# Patient Record
Sex: MALE | Race: BLACK OR AFRICAN AMERICAN | Employment: FULL TIME | URBAN - METROPOLITAN AREA
[De-identification: names, ages, dates, MRNs, and addresses within clinical notes are randomized per-mention and may not be internally consistent; named-entity substitution may affect disease eponyms.]

---

## 2017-02-14 ENCOUNTER — PATIENT MESSAGE (OUTPATIENT)
Dept: FAMILY MEDICINE CLINIC | Facility: CLINIC | Age: 27
End: 2017-02-14

## 2017-02-15 NOTE — TELEPHONE ENCOUNTER
From: Rebeca Delarosa  To: Rinku Corado MD  Sent: 2/14/2017 6:00 PM CST  Subject: Prescription Question    Fawn العراقي,     Can you write me a subscription for cephalexin 500.  I've taken it before for bumps that i would get in the back of my head through my previ

## 2017-11-06 ENCOUNTER — OFFICE VISIT (OUTPATIENT)
Dept: FAMILY MEDICINE CLINIC | Facility: CLINIC | Age: 27
End: 2017-11-06

## 2017-11-06 VITALS
DIASTOLIC BLOOD PRESSURE: 86 MMHG | HEIGHT: 75 IN | TEMPERATURE: 98 F | BODY MASS INDEX: 30.21 KG/M2 | SYSTOLIC BLOOD PRESSURE: 118 MMHG | RESPIRATION RATE: 16 BRPM | WEIGHT: 243 LBS | HEART RATE: 62 BPM

## 2017-11-06 DIAGNOSIS — Z13.21 SCREENING FOR ENDOCRINE, NUTRITIONAL, METABOLIC AND IMMUNITY DISORDER: ICD-10-CM

## 2017-11-06 DIAGNOSIS — Z23 NEED FOR PROPHYLACTIC VACCINATION AND INOCULATION AGAINST INFLUENZA: ICD-10-CM

## 2017-11-06 DIAGNOSIS — Z13.0 SCREENING FOR ENDOCRINE, NUTRITIONAL, METABOLIC AND IMMUNITY DISORDER: ICD-10-CM

## 2017-11-06 DIAGNOSIS — Z13.29 SCREENING FOR ENDOCRINE, NUTRITIONAL, METABOLIC AND IMMUNITY DISORDER: ICD-10-CM

## 2017-11-06 DIAGNOSIS — Z00.00 ANNUAL PHYSICAL EXAM: Primary | ICD-10-CM

## 2017-11-06 DIAGNOSIS — Z13.228 SCREENING FOR ENDOCRINE, NUTRITIONAL, METABOLIC AND IMMUNITY DISORDER: ICD-10-CM

## 2017-11-06 PROCEDURE — 90471 IMMUNIZATION ADMIN: CPT | Performed by: FAMILY MEDICINE

## 2017-11-06 PROCEDURE — 99395 PREV VISIT EST AGE 18-39: CPT | Performed by: FAMILY MEDICINE

## 2017-11-06 PROCEDURE — 90686 IIV4 VACC NO PRSV 0.5 ML IM: CPT | Performed by: FAMILY MEDICINE

## 2017-11-07 NOTE — PROGRESS NOTES
Chief Complaint:   Patient presents with:  Routine Physical    HPI:   This is a 32year old male due for physical.     Patient's due for labs, no new complaints. HPI: see above. History reviewed. No pertinent past medical history.   History review suicidal ideas, behavioral problems, sleep disturbance and agitation. The patient is not nervous/anxious.         EXAM:   /86   Pulse 62   Temp 97.7 °F (36.5 °C) (Oral)   Resp 16   Ht 75\"   Wt 243 lb   BMI 30.37 kg/m²  Estimated body mass index is 30 Annual physical exam  -due for physical, no new complaints. 2. Need for prophylactic vaccination and inoculation against influenza  -updated.    - FLULAVAL INFLUENZA VACCINE QUAD PRESERVATIVE FREE 0.5 ML    3. Screening for endocrine, nutritional, metab

## 2017-11-16 ENCOUNTER — LAB ENCOUNTER (OUTPATIENT)
Dept: LAB | Age: 27
End: 2017-11-16
Attending: FAMILY MEDICINE
Payer: COMMERCIAL

## 2017-11-16 DIAGNOSIS — Z13.29 SCREENING FOR ENDOCRINE, NUTRITIONAL, METABOLIC AND IMMUNITY DISORDER: ICD-10-CM

## 2017-11-16 DIAGNOSIS — Z13.21 SCREENING FOR ENDOCRINE, NUTRITIONAL, METABOLIC AND IMMUNITY DISORDER: ICD-10-CM

## 2017-11-16 DIAGNOSIS — Z13.228 SCREENING FOR ENDOCRINE, NUTRITIONAL, METABOLIC AND IMMUNITY DISORDER: ICD-10-CM

## 2017-11-16 DIAGNOSIS — Z13.0 SCREENING FOR ENDOCRINE, NUTRITIONAL, METABOLIC AND IMMUNITY DISORDER: ICD-10-CM

## 2017-11-16 PROCEDURE — 80061 LIPID PANEL: CPT | Performed by: FAMILY MEDICINE

## 2017-11-16 PROCEDURE — 80050 GENERAL HEALTH PANEL: CPT | Performed by: FAMILY MEDICINE

## 2017-11-16 PROCEDURE — 36415 COLL VENOUS BLD VENIPUNCTURE: CPT | Performed by: FAMILY MEDICINE

## 2017-11-22 ENCOUNTER — TELEPHONE (OUTPATIENT)
Dept: FAMILY MEDICINE CLINIC | Facility: CLINIC | Age: 27
End: 2017-11-22

## 2017-11-28 ENCOUNTER — TELEPHONE (OUTPATIENT)
Dept: FAMILY MEDICINE CLINIC | Facility: CLINIC | Age: 27
End: 2017-11-28

## 2017-11-28 NOTE — TELEPHONE ENCOUNTER
Patient left a form for his insurance carrier to be filled contingent the lab results the labs came back in on or around the 17th the patient needs the form asap.  Please contact him once ready

## 2017-11-29 NOTE — TELEPHONE ENCOUNTER
Called patient and let him know the form is completed and ready for him to  at the . Patient states understanding and will be coming in today to  the form.

## 2019-01-21 ENCOUNTER — PATIENT OUTREACH (OUTPATIENT)
Dept: FAMILY MEDICINE CLINIC | Facility: CLINIC | Age: 29
End: 2019-01-21

## 2019-03-18 ENCOUNTER — OFFICE VISIT (OUTPATIENT)
Dept: FAMILY MEDICINE CLINIC | Facility: CLINIC | Age: 29
End: 2019-03-18
Payer: COMMERCIAL

## 2019-03-18 VITALS
WEIGHT: 273 LBS | TEMPERATURE: 98 F | HEIGHT: 75 IN | BODY MASS INDEX: 33.94 KG/M2 | RESPIRATION RATE: 16 BRPM | DIASTOLIC BLOOD PRESSURE: 86 MMHG | HEART RATE: 74 BPM | SYSTOLIC BLOOD PRESSURE: 138 MMHG

## 2019-03-18 DIAGNOSIS — Z00.00 ANNUAL PHYSICAL EXAM: Primary | ICD-10-CM

## 2019-03-18 DIAGNOSIS — Z13.0 SCREENING FOR ENDOCRINE, NUTRITIONAL, METABOLIC AND IMMUNITY DISORDER: ICD-10-CM

## 2019-03-18 DIAGNOSIS — Z13.29 SCREENING FOR ENDOCRINE, NUTRITIONAL, METABOLIC AND IMMUNITY DISORDER: ICD-10-CM

## 2019-03-18 DIAGNOSIS — Z13.21 SCREENING FOR ENDOCRINE, NUTRITIONAL, METABOLIC AND IMMUNITY DISORDER: ICD-10-CM

## 2019-03-18 DIAGNOSIS — Z13.228 SCREENING FOR ENDOCRINE, NUTRITIONAL, METABOLIC AND IMMUNITY DISORDER: ICD-10-CM

## 2019-03-18 PROCEDURE — 99395 PREV VISIT EST AGE 18-39: CPT | Performed by: FAMILY MEDICINE

## 2019-03-19 NOTE — PROGRESS NOTES
Chief Complaint:   Patient presents with: Annual    HPI:   This is a 25year old male resenting for annual physical.  Patient has a history of elevated blood pressure currently under control no new complaints at this time. HPI: see above.      History easily. Psychiatric/Behavioral: Negative for suicidal ideas, behavioral problems, sleep disturbance and agitation. The patient is not nervous/anxious.         EXAM:   /86   Pulse 74   Temp 98.2 °F (36.8 °C) (Oral)   Resp 16   Ht 75\"   Wt 273 lb   B Diagnoses and all orders for this visit:    1. Annual physical exam  -wellness visit was done. 2. Screening for endocrine, nutritional, metabolic and immunity disorder  -due for labs. - CBC WITH DIFFERENTIAL WITH PLATELET;  Future  - COMP METABOLIC

## 2019-03-22 ENCOUNTER — LAB ENCOUNTER (OUTPATIENT)
Dept: LAB | Age: 29
End: 2019-03-22
Attending: FAMILY MEDICINE
Payer: COMMERCIAL

## 2019-03-22 DIAGNOSIS — Z13.29 SCREENING FOR ENDOCRINE, NUTRITIONAL, METABOLIC AND IMMUNITY DISORDER: ICD-10-CM

## 2019-03-22 DIAGNOSIS — Z13.228 SCREENING FOR ENDOCRINE, NUTRITIONAL, METABOLIC AND IMMUNITY DISORDER: ICD-10-CM

## 2019-03-22 DIAGNOSIS — Z13.21 SCREENING FOR ENDOCRINE, NUTRITIONAL, METABOLIC AND IMMUNITY DISORDER: ICD-10-CM

## 2019-03-22 DIAGNOSIS — Z13.0 SCREENING FOR ENDOCRINE, NUTRITIONAL, METABOLIC AND IMMUNITY DISORDER: ICD-10-CM

## 2019-03-22 LAB
ALBUMIN SERPL-MCNC: 4.3 G/DL (ref 3.4–5)
ALBUMIN/GLOB SERPL: 1.2 {RATIO} (ref 1–2)
ALP LIVER SERPL-CCNC: 59 U/L (ref 45–117)
ALT SERPL-CCNC: 23 U/L (ref 16–61)
ANION GAP SERPL CALC-SCNC: 6 MMOL/L (ref 0–18)
AST SERPL-CCNC: 26 U/L (ref 15–37)
BASOPHILS # BLD AUTO: 0.03 X10(3) UL (ref 0–0.2)
BASOPHILS NFR BLD AUTO: 0.7 %
BILIRUB SERPL-MCNC: 0.7 MG/DL (ref 0.1–2)
BUN BLD-MCNC: 19 MG/DL (ref 7–18)
BUN/CREAT SERPL: 13.1 (ref 10–20)
CALCIUM BLD-MCNC: 9.4 MG/DL (ref 8.5–10.1)
CHLORIDE SERPL-SCNC: 106 MMOL/L (ref 98–107)
CHOLEST SMN-MCNC: 154 MG/DL (ref ?–200)
CO2 SERPL-SCNC: 29 MMOL/L (ref 21–32)
CREAT BLD-MCNC: 1.45 MG/DL (ref 0.7–1.3)
DEPRECATED RDW RBC AUTO: 39.5 FL (ref 35.1–46.3)
EOSINOPHIL # BLD AUTO: 0.04 X10(3) UL (ref 0–0.7)
EOSINOPHIL NFR BLD AUTO: 0.9 %
ERYTHROCYTE [DISTWIDTH] IN BLOOD BY AUTOMATED COUNT: 12 % (ref 11–15)
GLOBULIN PLAS-MCNC: 3.5 G/DL (ref 2.8–4.4)
GLUCOSE BLD-MCNC: 96 MG/DL (ref 70–99)
HCT VFR BLD AUTO: 46.1 % (ref 39–53)
HDLC SERPL-MCNC: 37 MG/DL (ref 40–59)
HGB BLD-MCNC: 14.8 G/DL (ref 13–17.5)
IMM GRANULOCYTES # BLD AUTO: 0.01 X10(3) UL (ref 0–1)
IMM GRANULOCYTES NFR BLD: 0.2 %
LDLC SERPL CALC-MCNC: 101 MG/DL (ref ?–100)
LYMPHOCYTES # BLD AUTO: 1.74 X10(3) UL (ref 1–4)
LYMPHOCYTES NFR BLD AUTO: 40.9 %
M PROTEIN MFR SERPL ELPH: 7.8 G/DL (ref 6.4–8.2)
MCH RBC QN AUTO: 28.7 PG (ref 26–34)
MCHC RBC AUTO-ENTMCNC: 32.1 G/DL (ref 31–37)
MCV RBC AUTO: 89.3 FL (ref 80–100)
MONOCYTES # BLD AUTO: 0.52 X10(3) UL (ref 0.1–1)
MONOCYTES NFR BLD AUTO: 12.2 %
NEUTROPHILS # BLD AUTO: 1.91 X10 (3) UL (ref 1.5–7.7)
NEUTROPHILS # BLD AUTO: 1.91 X10(3) UL (ref 1.5–7.7)
NEUTROPHILS NFR BLD AUTO: 45.1 %
NONHDLC SERPL-MCNC: 117 MG/DL (ref ?–130)
OSMOLALITY SERPL CALC.SUM OF ELEC: 294 MOSM/KG (ref 275–295)
PLATELET # BLD AUTO: 216 10(3)UL (ref 150–450)
POTASSIUM SERPL-SCNC: 4.2 MMOL/L (ref 3.5–5.1)
RBC # BLD AUTO: 5.16 X10(6)UL (ref 4.3–5.7)
SODIUM SERPL-SCNC: 141 MMOL/L (ref 136–145)
TRIGL SERPL-MCNC: 80 MG/DL (ref 30–149)
TSI SER-ACNC: 1.03 MIU/ML (ref 0.36–3.74)
VLDLC SERPL CALC-MCNC: 16 MG/DL (ref 0–30)
WBC # BLD AUTO: 4.3 X10(3) UL (ref 4–11)

## 2019-03-22 PROCEDURE — 80050 GENERAL HEALTH PANEL: CPT | Performed by: FAMILY MEDICINE

## 2019-03-22 PROCEDURE — 80061 LIPID PANEL: CPT | Performed by: FAMILY MEDICINE

## 2019-03-22 PROCEDURE — 36415 COLL VENOUS BLD VENIPUNCTURE: CPT | Performed by: FAMILY MEDICINE

## 2019-03-25 ENCOUNTER — TELEPHONE (OUTPATIENT)
Dept: FAMILY MEDICINE CLINIC | Facility: CLINIC | Age: 29
End: 2019-03-25

## 2019-03-25 DIAGNOSIS — E86.0 DEHYDRATION: Primary | ICD-10-CM

## 2019-03-25 NOTE — TELEPHONE ENCOUNTER
----- Message from Paige Saldivar MD sent at 3/25/2019 10:37 AM CDT -----  Stable, recheck CMP in 6-12 months.

## 2019-05-28 ENCOUNTER — TELEPHONE (OUTPATIENT)
Dept: FAMILY MEDICINE CLINIC | Facility: CLINIC | Age: 29
End: 2019-05-28

## 2019-05-28 NOTE — TELEPHONE ENCOUNTER
Pt is coming in to bring a form that needs to filled out for his insurance for Dr. Prince lopez. He also is picking up a copy of his labs.

## 2020-03-25 ENCOUNTER — TELEPHONE (OUTPATIENT)
Dept: FAMILY MEDICINE CLINIC | Facility: CLINIC | Age: 30
End: 2020-03-25

## 2020-03-25 DIAGNOSIS — M62.830 SPASM OF THORACIC BACK MUSCLE: Primary | ICD-10-CM

## 2020-03-25 PROCEDURE — 99442 PHONE E/M BY PHYS 11-20 MIN: CPT | Performed by: NURSE PRACTITIONER

## 2020-03-25 RX ORDER — ACETAMINOPHEN 500 MG
TABLET ORAL EVERY 8 HOURS PRN
Qty: 30 TABLET | Refills: 0 | Status: SHIPPED | OUTPATIENT
Start: 2020-03-25 | End: 2020-06-10

## 2020-03-25 RX ORDER — METHYLPREDNISOLONE 4 MG/1
TABLET ORAL
Qty: 1 KIT | Refills: 0 | Status: SHIPPED | OUTPATIENT
Start: 2020-03-25 | End: 2020-06-10

## 2020-03-25 RX ORDER — CYCLOBENZAPRINE HCL 10 MG
10 TABLET ORAL NIGHTLY PRN
Qty: 15 TABLET | Refills: 0 | Status: SHIPPED | OUTPATIENT
Start: 2020-03-25 | End: 2020-06-10 | Stop reason: ALTCHOICE

## 2020-03-25 NOTE — TELEPHONE ENCOUNTER
Chief Complaint:   Patient presents with:  Patient Question    HPI:   Virtual/Telephone Check-In    Carmela Oar verbally consents to a Virtual/Telephone Check-In service on 03/25/20.     Patient understands and accepts financial responsibility for any d Patient requesting prescription as his pharmacy was out.   -F/u in 1 week if no improvement, sooner if worsening symptoms. - acetaminophen 500 MG Oral Tab;  Take 1-2 tablets (500-1,000 mg total) by mouth every 8 (eight) hours as needed for Pain.  - methyl

## 2020-03-25 NOTE — TELEPHONE ENCOUNTER
Please see below message, LOV 3/8/19. Would you like this to be a scheduled phone visit or can I triage him for orders?

## 2020-03-25 NOTE — TELEPHONE ENCOUNTER
Pt called, he states he thinks he hurt his back a few months ago working out. It hurts \"off & on\" more frequently this past week. He states it is mid back pain radiates to left side. He denies any urinary issues.   He takes OTC Ibuprofen & it helps tem

## 2020-06-10 ENCOUNTER — OFFICE VISIT (OUTPATIENT)
Dept: FAMILY MEDICINE CLINIC | Facility: CLINIC | Age: 30
End: 2020-06-10
Payer: COMMERCIAL

## 2020-06-10 ENCOUNTER — LAB ENCOUNTER (OUTPATIENT)
Dept: LAB | Age: 30
End: 2020-06-10
Attending: FAMILY MEDICINE
Payer: COMMERCIAL

## 2020-06-10 ENCOUNTER — HOSPITAL ENCOUNTER (OUTPATIENT)
Dept: GENERAL RADIOLOGY | Age: 30
Discharge: HOME | End: 2020-06-10
Attending: FAMILY MEDICINE
Payer: COMMERCIAL

## 2020-06-10 VITALS
OXYGEN SATURATION: 96 % | HEIGHT: 75 IN | TEMPERATURE: 97 F | WEIGHT: 271 LBS | HEART RATE: 100 BPM | DIASTOLIC BLOOD PRESSURE: 78 MMHG | BODY MASS INDEX: 33.69 KG/M2 | SYSTOLIC BLOOD PRESSURE: 128 MMHG | RESPIRATION RATE: 16 BRPM

## 2020-06-10 DIAGNOSIS — M54.6 DISCOGENIC THORACIC PAIN: ICD-10-CM

## 2020-06-10 DIAGNOSIS — Z00.00 LABORATORY EXAMINATION ORDERED AS PART OF A ROUTINE GENERAL MEDICAL EXAMINATION: ICD-10-CM

## 2020-06-10 DIAGNOSIS — Z00.00 ANNUAL PHYSICAL EXAM: Primary | ICD-10-CM

## 2020-06-10 PROCEDURE — 36415 COLL VENOUS BLD VENIPUNCTURE: CPT | Performed by: FAMILY MEDICINE

## 2020-06-10 PROCEDURE — 72072 X-RAY EXAM THORAC SPINE 3VWS: CPT | Performed by: FAMILY MEDICINE

## 2020-06-10 PROCEDURE — 80061 LIPID PANEL: CPT | Performed by: FAMILY MEDICINE

## 2020-06-10 PROCEDURE — 99395 PREV VISIT EST AGE 18-39: CPT | Performed by: FAMILY MEDICINE

## 2020-06-10 PROCEDURE — 80050 GENERAL HEALTH PANEL: CPT | Performed by: FAMILY MEDICINE

## 2020-06-10 RX ORDER — DICLOFENAC SODIUM 75 MG/1
75 TABLET, DELAYED RELEASE ORAL 2 TIMES DAILY PRN
Qty: 30 TABLET | Refills: 2 | Status: SHIPPED | OUTPATIENT
Start: 2020-06-10 | End: 2021-04-13

## 2020-06-12 ENCOUNTER — TELEPHONE (OUTPATIENT)
Dept: FAMILY MEDICINE CLINIC | Facility: CLINIC | Age: 30
End: 2020-06-12

## 2020-06-12 DIAGNOSIS — M41.9 SCOLIOSIS, UNSPECIFIED SCOLIOSIS TYPE, UNSPECIFIED SPINAL REGION: Primary | ICD-10-CM

## 2020-06-12 NOTE — PROGRESS NOTES
Chief Complaint:   Patient presents with:  Physical    HPI:   38-year-old male presenting for a follow-up. Patient has a history of high BMI has been off with his diet control and exercise.   Patient reports that he was having mild midthoracic pain on the throat and voice change. Eyes: Negative for photophobia, pain, discharge, redness, itching and visual disturbance. Respiratory: Negative for cough, chest tightness and shortness of breath.     Cardiovascular: Negative for chest pain, palpitations and l exhibits no discharge. Left eye exhibits no discharge. Neck: Normal range of motion. Neck supple. No JVD present. No tracheal deviation present. No thyromegaly present.    Cardiovascular: Normal rate, regular rhythm, normal heart sounds and intact distal

## 2020-06-12 NOTE — TELEPHONE ENCOUNTER
----- Message from Jeff Gomez MD sent at 6/10/2020 12:27 PM CDT -----  Mild scoliosis-very minor, likely related to posture. Normal spine.  Please refer to PT.

## 2020-06-29 ENCOUNTER — OFFICE VISIT (OUTPATIENT)
Dept: PHYSICAL THERAPY | Age: 30
End: 2020-06-29
Attending: FAMILY MEDICINE
Payer: COMMERCIAL

## 2020-06-29 PROCEDURE — 97110 THERAPEUTIC EXERCISES: CPT | Performed by: PHYSICAL THERAPIST

## 2020-06-29 PROCEDURE — 97161 PT EVAL LOW COMPLEX 20 MIN: CPT | Performed by: PHYSICAL THERAPIST

## 2020-06-29 NOTE — PROGRESS NOTES
SPINE EVALUATION:   Referring Physician: Dr. Levon Feng  Diagnosis: scoliosis      Date of Service: 6/29/2020     PATIENT SUMMARY   Maryellen Davila is a 26year old male who presents to therapy today with complaints of left side upper back pain.  The pain has without reported pain. Skilled Physical Therapy is medically necessary to address the above impairments and reach functional goals.      Precautions:  None  OBJECTIVE:   Observation/Posture: Standing unremarkable, slight increase in right ER compared with l plane, lat stretch, seated figure four stretch, quadruped UE/LE reaches. No change in symptoms following therapy session.      Charges: PT Eval Low Complexity, TherEx      Total Timed Treatment: 10 min     Total Treatment Time: 45 min     Based on clinical

## 2020-07-01 ENCOUNTER — OFFICE VISIT (OUTPATIENT)
Dept: PHYSICAL THERAPY | Age: 30
End: 2020-07-01
Attending: FAMILY MEDICINE
Payer: COMMERCIAL

## 2020-07-01 PROCEDURE — 97140 MANUAL THERAPY 1/> REGIONS: CPT | Performed by: PHYSICAL THERAPIST

## 2020-07-01 PROCEDURE — 97110 THERAPEUTIC EXERCISES: CPT | Performed by: PHYSICAL THERAPIST

## 2020-07-01 NOTE — PROGRESS NOTES
Dx: Scoliosis 8           Authorized # of Visits:  8  Fall Risk: standard         Precautions: n/a             Subjective: The patient states he is feels good with the exercises. No pain today. Objective:    Instructed the patient in proper postural ali

## 2020-07-06 ENCOUNTER — OFFICE VISIT (OUTPATIENT)
Dept: PHYSICAL THERAPY | Age: 30
End: 2020-07-06
Attending: FAMILY MEDICINE
Payer: COMMERCIAL

## 2020-07-06 ENCOUNTER — APPOINTMENT (OUTPATIENT)
Dept: PHYSICAL THERAPY | Age: 30
End: 2020-07-06
Attending: FAMILY MEDICINE
Payer: COMMERCIAL

## 2020-07-06 PROCEDURE — 97110 THERAPEUTIC EXERCISES: CPT

## 2020-07-06 PROCEDURE — 97140 MANUAL THERAPY 1/> REGIONS: CPT

## 2020-07-06 NOTE — PROGRESS NOTES
Dx: Scoliosis 8           Authorized # of Visits:  8  Fall Risk: standard         Precautions: n/a             Subjective: The patient comes into therapy with no pain or discomfort.  Alonso Flowers states the FR stretch he started performing at home has made a huge standard and elevated rows w/ 3 sec holds, x 10 reps, 2 sets         Thoracic grade 2-3 PA's and thoracic transverse mobilizations        Wall UE slide frontal plane and transverse plane stretch, hip hinge instruction  --        Left hip mobilization inf/l

## 2020-07-08 ENCOUNTER — OFFICE VISIT (OUTPATIENT)
Dept: PHYSICAL THERAPY | Age: 30
End: 2020-07-08
Attending: FAMILY MEDICINE
Payer: COMMERCIAL

## 2020-07-08 ENCOUNTER — APPOINTMENT (OUTPATIENT)
Dept: PHYSICAL THERAPY | Age: 30
End: 2020-07-08
Attending: FAMILY MEDICINE
Payer: COMMERCIAL

## 2020-07-08 PROCEDURE — 97110 THERAPEUTIC EXERCISES: CPT

## 2020-07-08 PROCEDURE — 97140 MANUAL THERAPY 1/> REGIONS: CPT

## 2020-07-08 NOTE — PROGRESS NOTES
Dx: Scoliosis 8           Authorized # of Visits:  8  Fall Risk: standard         Precautions: n/a             Subjective: The patient comes into therapy with VAS 5/10 thoracic back pain.  Miguel Amaya notes he has endured some increased discomfort since his last TRX shoulder flexion reps, x 20 reps, w/ 3 sec holds        Thoracic grade 2-3 PA's and thoracic transverse mobilizations --       Wall UE slide frontal plane and transverse plane stretch, hip hinge instruction  -- Lateral thoracic glides w/ thorac

## 2020-07-13 ENCOUNTER — OFFICE VISIT (OUTPATIENT)
Dept: PHYSICAL THERAPY | Age: 30
End: 2020-07-13
Attending: FAMILY MEDICINE
Payer: COMMERCIAL

## 2020-07-13 PROCEDURE — 97110 THERAPEUTIC EXERCISES: CPT | Performed by: PHYSICAL THERAPIST

## 2020-07-13 PROCEDURE — 97140 MANUAL THERAPY 1/> REGIONS: CPT | Performed by: PHYSICAL THERAPIST

## 2020-07-13 NOTE — PROGRESS NOTES
Dx: Scoliosis 8           Authorized # of Visits:  8  Fall Risk: standard         Precautions: n/a             Subjective: The patient states he has no pain today. He states one of his sessions last week created pain.  It involved lifting weights out to t thrust manipulation from T4-T10 Manual therapy: soft tissue release T/L spine - lats rhomboids/traps       Left hip mobilization inf/lat glides, PROM Left hip mobilization lat paula, PROM, w/ Sixto technique --        Education on how to perform latera

## 2020-07-15 ENCOUNTER — OFFICE VISIT (OUTPATIENT)
Dept: PHYSICAL THERAPY | Age: 30
End: 2020-07-15
Attending: FAMILY MEDICINE
Payer: COMMERCIAL

## 2020-07-15 PROCEDURE — 97140 MANUAL THERAPY 1/> REGIONS: CPT | Performed by: PHYSICAL THERAPIST

## 2020-07-15 PROCEDURE — 97110 THERAPEUTIC EXERCISES: CPT | Performed by: PHYSICAL THERAPIST

## 2020-07-15 NOTE — PROGRESS NOTES
Dx: Scoliosis 8           Authorized # of Visits:  8  Fall Risk: standard         Precautions: n/a             Subjective: The patient states that he is still getting some pain time to time but with a support in his low back, pain is decreased.  He felt g Seated row 120 pounds 10 reps x 3       Thoracic grade 2-3 PA's and thoracic transverse mobilizations -- HS mobility with opposite arm reach  Manual therapy       Left hip mobilization, inf/lat glide, long axis distraction      Wall UE slide frontal plane

## 2020-07-20 ENCOUNTER — OFFICE VISIT (OUTPATIENT)
Dept: PHYSICAL THERAPY | Age: 30
End: 2020-07-20
Attending: FAMILY MEDICINE
Payer: COMMERCIAL

## 2020-07-20 PROCEDURE — 97110 THERAPEUTIC EXERCISES: CPT | Performed by: PHYSICAL THERAPIST

## 2020-07-20 NOTE — PROGRESS NOTES
Dx: Scoliosis 8           Authorized # of Visits:  8  Fall Risk: standard         Precautions: n/a             Subjective:   Patient states he had one episode of pain over the weekend. It was last night. Pain was left side only.    Objective:   No pain wit reps, 2 sets TRX standard and elevated rows w/ 3 sec holds, x 10 reps, 2 sets Hip IR 10 reps each with UE  transverse plane shoulder height  Split stance 9 pound bar chop 15 reps each  TRX squats instruction maintaining lumbar curve       TRX shoulde

## 2020-07-22 ENCOUNTER — OFFICE VISIT (OUTPATIENT)
Dept: PHYSICAL THERAPY | Age: 30
End: 2020-07-22
Attending: FAMILY MEDICINE
Payer: COMMERCIAL

## 2020-07-22 PROCEDURE — 97140 MANUAL THERAPY 1/> REGIONS: CPT | Performed by: PHYSICAL THERAPIST

## 2020-07-22 PROCEDURE — 97110 THERAPEUTIC EXERCISES: CPT | Performed by: PHYSICAL THERAPIST

## 2020-07-22 NOTE — PROGRESS NOTES
Dx: Scoliosis 8           Authorized # of Visits:  10  Fall Risk: standard         Precautions: n/a             Subjective: The patient states that pain upper back pain is still coming and going but not as frequently and not as severe.    Objective:   Lamberto holds, x 10 reps, 2 sets Hip IR 10 reps each with UE  transverse plane shoulder height  Split stance 9 pound bar chop 15 reps each  TRX squats instruction maintaining lumbar curve  20 pound overhead carry right and left UE     TRX shoulder flexion re

## 2020-07-27 ENCOUNTER — APPOINTMENT (OUTPATIENT)
Dept: PHYSICAL THERAPY | Age: 30
End: 2020-07-27
Attending: FAMILY MEDICINE
Payer: COMMERCIAL

## 2020-07-28 ENCOUNTER — APPOINTMENT (OUTPATIENT)
Dept: PHYSICAL THERAPY | Age: 30
End: 2020-07-28
Attending: FAMILY MEDICINE
Payer: COMMERCIAL

## 2020-07-29 ENCOUNTER — APPOINTMENT (OUTPATIENT)
Dept: PHYSICAL THERAPY | Age: 30
End: 2020-07-29
Attending: FAMILY MEDICINE
Payer: COMMERCIAL

## 2020-10-29 ENCOUNTER — IMMUNIZATION (OUTPATIENT)
Dept: FAMILY MEDICINE CLINIC | Facility: CLINIC | Age: 30
End: 2020-10-29
Payer: COMMERCIAL

## 2020-10-29 DIAGNOSIS — Z23 NEED FOR VACCINATION: ICD-10-CM

## 2020-10-29 PROCEDURE — 90686 IIV4 VACC NO PRSV 0.5 ML IM: CPT | Performed by: FAMILY MEDICINE

## 2020-10-29 PROCEDURE — 90471 IMMUNIZATION ADMIN: CPT | Performed by: FAMILY MEDICINE

## 2020-11-03 ENCOUNTER — MED REC SCAN ONLY (OUTPATIENT)
Dept: FAMILY MEDICINE CLINIC | Facility: CLINIC | Age: 30
End: 2020-11-03

## 2021-04-13 ENCOUNTER — HOSPITAL ENCOUNTER (OUTPATIENT)
Age: 31
Discharge: HOME | End: 2021-04-13
Payer: COMMERCIAL

## 2021-04-13 VITALS
SYSTOLIC BLOOD PRESSURE: 136 MMHG | RESPIRATION RATE: 16 BRPM | WEIGHT: 240 LBS | TEMPERATURE: 98 F | OXYGEN SATURATION: 99 % | HEART RATE: 77 BPM | HEIGHT: 76 IN | DIASTOLIC BLOOD PRESSURE: 82 MMHG | BODY MASS INDEX: 29.22 KG/M2

## 2021-04-13 DIAGNOSIS — U07.1 LAB TEST POSITIVE FOR DETECTION OF COVID-19 VIRUS: Primary | ICD-10-CM

## 2021-04-13 PROCEDURE — 99203 OFFICE O/P NEW LOW 30 MIN: CPT

## 2021-04-13 PROCEDURE — 99212 OFFICE O/P EST SF 10 MIN: CPT

## 2021-04-13 NOTE — ED PROVIDER NOTES
Patient Seen in: [facility]      History   Patient presents with:  Testing    Stated Complaint: COVID test    HPI/Subjective:   HPI    53-year-old male here with his son for Covid testing due to close exposure. Patient is asymptomatic. Cardiovascular:      Rate and Rhythm: Normal rate and regular rhythm. Pulses: Normal pulses. Heart sounds: Normal heart sounds. Pulmonary:      Effort: Pulmonary effort is normal.      Breath sounds: Normal breath sounds.    Musculoskeletal: conversations with PCP and charts were reviewed.                                 Disposition and Plan     Clinical Impression:  Lab test positive for detection of COVID-19 virus  (primary encounter diagnosis)     Disposition:  Discharge  4/13/2021  6:54 pm

## 2021-04-13 NOTE — ED INITIAL ASSESSMENT (HPI)
Pt c/o here for COVID-19 testing after exposure to a family member today who tested positive for COVID-19 today. Pt denies any symptoms.

## 2021-04-15 ENCOUNTER — TELEPHONE (OUTPATIENT)
Dept: FAMILY MEDICINE CLINIC | Facility: CLINIC | Age: 31
End: 2021-04-15

## 2021-04-15 NOTE — TELEPHONE ENCOUNTER
Patient Monserrat Lujan calling because he was dx with covid on  4-13, however, he had first pfizer covid vaccine at [facility] on 3-20.   Patient wants to know if he should go ahead and get the second dose on 4-20 as planned, please call the patient to advise

## 2021-04-15 NOTE — TELEPHONE ENCOUNTER
Called patient and spoke with him. Advised patient he is okay to get the Covid vaccine after he is feeling better and he is out of quarantine. Patient states understanding.

## 2021-10-04 ENCOUNTER — OFFICE VISIT (OUTPATIENT)
Dept: FAMILY MEDICINE CLINIC | Facility: CLINIC | Age: 31
End: 2021-10-04
Payer: COMMERCIAL

## 2021-10-04 VITALS
DIASTOLIC BLOOD PRESSURE: 64 MMHG | BODY MASS INDEX: 32.51 KG/M2 | HEART RATE: 68 BPM | WEIGHT: 267 LBS | SYSTOLIC BLOOD PRESSURE: 112 MMHG | HEIGHT: 76 IN | OXYGEN SATURATION: 98 % | RESPIRATION RATE: 16 BRPM

## 2021-10-04 DIAGNOSIS — Z23 NEED FOR VACCINATION: ICD-10-CM

## 2021-10-04 DIAGNOSIS — L73.9 FOLLICULITIS: ICD-10-CM

## 2021-10-04 DIAGNOSIS — Z00.00 ANNUAL PHYSICAL EXAM: Primary | ICD-10-CM

## 2021-10-04 DIAGNOSIS — Z00.00 LABORATORY EXAMINATION ORDERED AS PART OF A ROUTINE GENERAL MEDICAL EXAMINATION: ICD-10-CM

## 2021-10-04 PROCEDURE — 3074F SYST BP LT 130 MM HG: CPT | Performed by: FAMILY MEDICINE

## 2021-10-04 PROCEDURE — 90471 IMMUNIZATION ADMIN: CPT | Performed by: FAMILY MEDICINE

## 2021-10-04 PROCEDURE — 3008F BODY MASS INDEX DOCD: CPT | Performed by: FAMILY MEDICINE

## 2021-10-04 PROCEDURE — 99395 PREV VISIT EST AGE 18-39: CPT | Performed by: FAMILY MEDICINE

## 2021-10-04 PROCEDURE — 3078F DIAST BP <80 MM HG: CPT | Performed by: FAMILY MEDICINE

## 2021-10-04 PROCEDURE — 90686 IIV4 VACC NO PRSV 0.5 ML IM: CPT | Performed by: FAMILY MEDICINE

## 2021-10-04 RX ORDER — CEPHALEXIN 500 MG/1
500 TABLET ORAL 2 TIMES DAILY
Qty: 28 TABLET | Refills: 0 | Status: SHIPPED | OUTPATIENT
Start: 2021-10-04 | End: 2021-10-04

## 2021-10-04 RX ORDER — CLINDAMYCIN PHOSPHATE 10 MG/G
1 GEL TOPICAL NIGHTLY
Qty: 60 G | Refills: 2 | Status: SHIPPED | OUTPATIENT
Start: 2021-10-04 | End: 2022-09-29

## 2021-10-04 RX ORDER — CEPHALEXIN 500 MG/1
500 TABLET ORAL 2 TIMES DAILY
Qty: 28 TABLET | Refills: 1 | Status: SHIPPED | OUTPATIENT
Start: 2021-10-04 | End: 2021-10-18

## 2021-10-04 RX ORDER — MULTIVITAMIN
1 TABLET ORAL DAILY
COMMUNITY

## 2021-10-05 NOTE — PROGRESS NOTES
Chief Complaint:   Patient presents with:  Physical    HPI:   77-year-old male presenting for annual physical:   Patient denies shortness of breath, denies chest pain and denies any recent fevers or chills.     Patient reports no urinary complaints and joaquín Gastrointestinal: Negative for vomiting, abdominal pain, diarrhea, blood in stool and abdominal distention. Endocrine: Negative for cold intolerance, heat intolerance, polydipsia, polyphagia and polyuria.    Genitourinary: Negative for dysuria, hematuri present. Pulmonary/Chest: Effort normal and breath sounds normal. No stridor. No respiratory distress. He has no wheezes. Abdominal: Soft. Bowel sounds are normal. He exhibits no distension. There is no abdominal tenderness.  There is no rebound and no g

## 2021-10-09 ENCOUNTER — LAB ENCOUNTER (OUTPATIENT)
Dept: LAB | Age: 31
End: 2021-10-09
Attending: FAMILY MEDICINE
Payer: COMMERCIAL

## 2021-10-09 DIAGNOSIS — Z00.00 LABORATORY EXAMINATION ORDERED AS PART OF A ROUTINE GENERAL MEDICAL EXAMINATION: ICD-10-CM

## 2021-10-09 PROCEDURE — 80050 GENERAL HEALTH PANEL: CPT | Performed by: FAMILY MEDICINE

## 2021-10-09 PROCEDURE — 80061 LIPID PANEL: CPT | Performed by: FAMILY MEDICINE

## 2021-12-03 ENCOUNTER — IMMUNIZATION (OUTPATIENT)
Dept: LAB | Facility: HOSPITAL | Age: 31
End: 2021-12-03
Attending: EMERGENCY MEDICINE
Payer: COMMERCIAL

## 2021-12-03 DIAGNOSIS — Z23 NEED FOR VACCINATION: Primary | ICD-10-CM

## 2021-12-03 PROCEDURE — 0004A SARSCOV2 VAC 30MCG/0.3ML IM: CPT

## 2022-09-03 RX ORDER — CLINDAMYCIN PHOSPHATE 10 MG/G
1 GEL TOPICAL NIGHTLY
Qty: 60 G | Refills: 2 | OUTPATIENT
Start: 2022-09-03 | End: 2023-08-29

## 2022-09-19 ENCOUNTER — HOSPITAL ENCOUNTER (OUTPATIENT)
Age: 32
Discharge: HOME | End: 2022-09-19
Attending: EMERGENCY MEDICINE

## 2022-09-19 VITALS
RESPIRATION RATE: 16 BRPM | TEMPERATURE: 98 F | DIASTOLIC BLOOD PRESSURE: 92 MMHG | HEART RATE: 77 BPM | SYSTOLIC BLOOD PRESSURE: 138 MMHG | OXYGEN SATURATION: 98 %

## 2022-09-19 DIAGNOSIS — H10.32 ACUTE CONJUNCTIVITIS OF LEFT EYE, UNSPECIFIED ACUTE CONJUNCTIVITIS TYPE: Primary | ICD-10-CM

## 2022-09-19 PROCEDURE — 99213 OFFICE O/P EST LOW 20 MIN: CPT

## 2022-09-19 RX ORDER — POLYMYXIN B SULFATE AND TRIMETHOPRIM 1; 10000 MG/ML; [USP'U]/ML
1 SOLUTION OPHTHALMIC
Qty: 10 ML | Refills: 0 | Status: SHIPPED | OUTPATIENT
Start: 2022-09-19 | End: 2022-09-24

## 2022-10-27 ENCOUNTER — OFFICE VISIT (OUTPATIENT)
Dept: FAMILY MEDICINE CLINIC | Facility: CLINIC | Age: 32
End: 2022-10-27
Payer: COMMERCIAL

## 2022-10-27 DIAGNOSIS — Z30.09 FAMILY PLANNING COUNSELING: ICD-10-CM

## 2022-10-27 DIAGNOSIS — Z23 NEED FOR VACCINATION: ICD-10-CM

## 2022-10-27 DIAGNOSIS — Z00.00 ANNUAL PHYSICAL EXAM: Primary | ICD-10-CM

## 2022-10-27 DIAGNOSIS — R03.0 ELEVATED BLOOD PRESSURE, SITUATIONAL: ICD-10-CM

## 2022-10-27 DIAGNOSIS — Z13.29 SCREENING FOR ENDOCRINE DISORDER: ICD-10-CM

## 2022-10-27 PROCEDURE — 99395 PREV VISIT EST AGE 18-39: CPT | Performed by: FAMILY MEDICINE

## 2022-10-27 PROCEDURE — 90686 IIV4 VACC NO PRSV 0.5 ML IM: CPT | Performed by: FAMILY MEDICINE

## 2022-10-27 PROCEDURE — 3079F DIAST BP 80-89 MM HG: CPT | Performed by: FAMILY MEDICINE

## 2022-10-27 PROCEDURE — 3008F BODY MASS INDEX DOCD: CPT | Performed by: FAMILY MEDICINE

## 2022-10-27 PROCEDURE — 90471 IMMUNIZATION ADMIN: CPT | Performed by: FAMILY MEDICINE

## 2022-10-27 PROCEDURE — 3075F SYST BP GE 130 - 139MM HG: CPT | Performed by: FAMILY MEDICINE

## 2022-10-27 RX ORDER — CEPHALEXIN 500 MG/1
500 TABLET ORAL 2 TIMES DAILY
Qty: 28 TABLET | Refills: 2 | Status: SHIPPED | OUTPATIENT
Start: 2022-10-27 | End: 2022-11-10

## 2022-10-29 VITALS
OXYGEN SATURATION: 99 % | DIASTOLIC BLOOD PRESSURE: 88 MMHG | WEIGHT: 265 LBS | BODY MASS INDEX: 32.27 KG/M2 | HEIGHT: 76 IN | RESPIRATION RATE: 16 BRPM | HEART RATE: 78 BPM | SYSTOLIC BLOOD PRESSURE: 138 MMHG | TEMPERATURE: 98 F

## 2022-11-04 ENCOUNTER — LAB ENCOUNTER (OUTPATIENT)
Dept: LAB | Age: 32
End: 2022-11-04
Attending: FAMILY MEDICINE
Payer: COMMERCIAL

## 2022-11-04 DIAGNOSIS — Z13.29 SCREENING FOR ENDOCRINE DISORDER: ICD-10-CM

## 2022-11-04 LAB
ALBUMIN SERPL-MCNC: 4.3 G/DL (ref 3.4–5)
ALBUMIN/GLOB SERPL: 1.4 {RATIO} (ref 1–2)
ALP LIVER SERPL-CCNC: 56 U/L
ALT SERPL-CCNC: 20 U/L
ANION GAP SERPL CALC-SCNC: 5 MMOL/L (ref 0–18)
AST SERPL-CCNC: 16 U/L (ref 15–37)
BASOPHILS # BLD AUTO: 0.05 X10(3) UL (ref 0–0.2)
BASOPHILS NFR BLD AUTO: 1 %
BILIRUB SERPL-MCNC: 0.5 MG/DL (ref 0.1–2)
BUN BLD-MCNC: 18 MG/DL (ref 7–18)
CALCIUM BLD-MCNC: 9.4 MG/DL (ref 8.5–10.1)
CHLORIDE SERPL-SCNC: 106 MMOL/L (ref 98–112)
CHOLEST SERPL-MCNC: 164 MG/DL (ref ?–200)
CO2 SERPL-SCNC: 29 MMOL/L (ref 21–32)
CREAT BLD-MCNC: 1.44 MG/DL
EOSINOPHIL # BLD AUTO: 0.05 X10(3) UL (ref 0–0.7)
EOSINOPHIL NFR BLD AUTO: 1 %
ERYTHROCYTE [DISTWIDTH] IN BLOOD BY AUTOMATED COUNT: 12.8 %
FASTING PATIENT LIPID ANSWER: YES
FASTING STATUS PATIENT QL REPORTED: YES
GFR SERPLBLD BASED ON 1.73 SQ M-ARVRAT: 65 ML/MIN/1.73M2 (ref 60–?)
GLOBULIN PLAS-MCNC: 3 G/DL (ref 2.8–4.4)
GLUCOSE BLD-MCNC: 110 MG/DL (ref 70–99)
HCT VFR BLD AUTO: 45.1 %
HDLC SERPL-MCNC: 43 MG/DL (ref 40–59)
HGB BLD-MCNC: 14.5 G/DL
IMM GRANULOCYTES # BLD AUTO: 0.01 X10(3) UL (ref 0–1)
IMM GRANULOCYTES NFR BLD: 0.2 %
LDLC SERPL CALC-MCNC: 103 MG/DL (ref ?–100)
LYMPHOCYTES # BLD AUTO: 2.26 X10(3) UL (ref 1–4)
LYMPHOCYTES NFR BLD AUTO: 45.5 %
MCH RBC QN AUTO: 28.9 PG (ref 26–34)
MCHC RBC AUTO-ENTMCNC: 32.2 G/DL (ref 31–37)
MCV RBC AUTO: 90 FL
MONOCYTES # BLD AUTO: 0.49 X10(3) UL (ref 0.1–1)
MONOCYTES NFR BLD AUTO: 9.9 %
NEUTROPHILS # BLD AUTO: 2.11 X10 (3) UL (ref 1.5–7.7)
NEUTROPHILS # BLD AUTO: 2.11 X10(3) UL (ref 1.5–7.7)
NEUTROPHILS NFR BLD AUTO: 42.4 %
NONHDLC SERPL-MCNC: 121 MG/DL (ref ?–130)
OSMOLALITY SERPL CALC.SUM OF ELEC: 293 MOSM/KG (ref 275–295)
PLATELET # BLD AUTO: 201 10(3)UL (ref 150–450)
POTASSIUM SERPL-SCNC: 4.3 MMOL/L (ref 3.5–5.1)
PROT SERPL-MCNC: 7.3 G/DL (ref 6.4–8.2)
RBC # BLD AUTO: 5.01 X10(6)UL
SODIUM SERPL-SCNC: 140 MMOL/L (ref 136–145)
T4 FREE SERPL-MCNC: 0.9 NG/DL (ref 0.8–1.7)
TRIGL SERPL-MCNC: 96 MG/DL (ref 30–149)
TSI SER-ACNC: 1.33 MIU/ML (ref 0.36–3.74)
VLDLC SERPL CALC-MCNC: 16 MG/DL (ref 0–30)
WBC # BLD AUTO: 5 X10(3) UL (ref 4–11)

## 2022-11-04 PROCEDURE — 84439 ASSAY OF FREE THYROXINE: CPT | Performed by: FAMILY MEDICINE

## 2022-11-04 PROCEDURE — 80061 LIPID PANEL: CPT | Performed by: FAMILY MEDICINE

## 2022-11-04 PROCEDURE — 80050 GENERAL HEALTH PANEL: CPT | Performed by: FAMILY MEDICINE

## 2022-12-11 ENCOUNTER — PATIENT MESSAGE (OUTPATIENT)
Dept: FAMILY MEDICINE CLINIC | Facility: CLINIC | Age: 32
End: 2022-12-11

## 2022-12-12 NOTE — TELEPHONE ENCOUNTER
2. Elevated blood pressure, situational  -watching for now, low carb, aerobic exercise and BP rx if not improved, to see me in 3-6 months to recheck BP

## 2022-12-13 ENCOUNTER — OFFICE VISIT (OUTPATIENT)
Dept: FAMILY MEDICINE CLINIC | Facility: CLINIC | Age: 32
End: 2022-12-13
Payer: COMMERCIAL

## 2022-12-13 VITALS
BODY MASS INDEX: 32.39 KG/M2 | HEIGHT: 76 IN | WEIGHT: 266 LBS | SYSTOLIC BLOOD PRESSURE: 124 MMHG | OXYGEN SATURATION: 96 % | HEART RATE: 97 BPM | DIASTOLIC BLOOD PRESSURE: 86 MMHG | RESPIRATION RATE: 16 BRPM

## 2022-12-13 DIAGNOSIS — R03.0 ELEVATED BLOOD PRESSURE, SITUATIONAL: Primary | ICD-10-CM

## 2022-12-13 DIAGNOSIS — F43.22 ADJUSTMENT DISORDER WITH ANXIETY: ICD-10-CM

## 2022-12-13 PROCEDURE — 3074F SYST BP LT 130 MM HG: CPT | Performed by: FAMILY MEDICINE

## 2022-12-13 PROCEDURE — 3079F DIAST BP 80-89 MM HG: CPT | Performed by: FAMILY MEDICINE

## 2022-12-13 PROCEDURE — 99214 OFFICE O/P EST MOD 30 MIN: CPT | Performed by: FAMILY MEDICINE

## 2022-12-13 PROCEDURE — 3008F BODY MASS INDEX DOCD: CPT | Performed by: FAMILY MEDICINE

## 2023-08-28 ENCOUNTER — OFFICE VISIT (OUTPATIENT)
Dept: SURGERY | Facility: CLINIC | Age: 33
End: 2023-08-28

## 2023-08-28 DIAGNOSIS — Z30.2 ENCOUNTER FOR STERILIZATION: Primary | ICD-10-CM

## 2023-08-28 RX ORDER — DIAZEPAM 10 MG/1
10 TABLET ORAL SEE ADMIN INSTRUCTIONS
Qty: 1 TABLET | Refills: 0 | Status: SHIPPED | OUTPATIENT
Start: 2023-08-28

## 2023-08-28 NOTE — PROGRESS NOTES
Urology Clinic Note - New Patient    Referring Provider:  No referring provider defined for this encounter. Primary Care Provider:  Clement Mejía MD     Chief Complaint:   Referral for vasectomy    HPI:   Matilde Sher is a 26year old male with no PMH referred for interest in male sterility via vasectomy. He has 2 current children with his wife. He has thought about vasectomy for a while now and desires no further children. Blood thinners: No  Prior bleeding problems: No  Prior scrotal surgery: No    PSA:  No results found for: PSA, PERCENTPSA, PSAS, PSAULTRA     History:   No past medical history on file. No past surgical history on file. Family History   Problem Relation Age of Onset    Diabetes Maternal Grandmother        Social History     Socioeconomic History    Marital status:    Tobacco Use    Smoking status: Never    Smokeless tobacco: Never   Vaping Use    Vaping Use: Never used   Substance and Sexual Activity    Alcohol use: No     Alcohol/week: 0.0 standard drinks of alcohol    Drug use: No   Other Topics Concern    Caffeine Concern No    Exercise No    Seat Belt No    Special Diet No    Stress Concern No    Weight Concern No       Medications (Active prior to today's visit):  Current Outpatient Medications   Medication Sig Dispense Refill    Multiple Vitamin (ONE-DAILY MULTI VITAMINS) Oral Tab Take 1 tablet by mouth daily. Allergies:  No Known Allergies    Review of Systems:   A comprehensive 10-point review of systems was completed. Pertinent positives and negatives are noted in the the HPI.     Physical Exam:   CONSTITUTIONAL: Well developed, well nourished, in no acute distress  NEUROLOGIC: Alert and oriented  HEAD: Normocephalic, atraumatic  EYES: Sclera non-icteric  ENT: Hearing intact, moist mucous membranes  NECK: No obvious goiter or masses  RESPIRATORY: Normal respiratory effort  SKIN: No evident rashes  ABDOMEN: Soft, non-tender, non-distended  GENITOURINARY: Normal phallus, orthotopic meatus, normal bilateral testicles, palpable vasa bilaterally    Assessment & Plan:   Maria E Mason is a 26year old male referred for vasectomy consult. We discussed that a vasectomy is intended to be a permanent form of contraception and that if he desires fertility after vasectomy, options included vasectomy reversal and sperm retrieval with possible in vitro fertilization. These options are not always successful and may be very expensive. We also discussed the risks of vasectomy which include symptomatic hematoma and infection (1-2%), chronic scrotal pain (6%; caused by congestive epididymitis, sperm granuloma and/or infective epididymoorchitis), need for repeat vasectomy (<1% of cases), need for additional procedures, vasectomy failure, and unwanted pregnancy (1 in 2000 men). The chronic scrotal discomfort may be no more than a low-grade chronic ache that causes little disability and requires only symptomatic treatment. However, a small percentage of patients will be sufficiently debilitated to seek epididymectomy or even orchiectomy (removal of the epididymis and/or testicle). Furthermore, for a very small number of patients, even this radical surgery will not provide relief from their scrotal discomfort. We discussed that he will have two small incisions in his scrotum with dissolvable sutures. He was told that vasectomy does NOT produce immediate sterility and that following vasectomy, another form of contraception is required until vas occlusion is confirmed by post-vasectomy semen analysis. Post-vasectomy semen analysis will be obtained 10-12 weeks after the vasectomy. He was told that he may stop using other methods of contraception when examination of one well-mixed, uncentrifuged, fresh post-vasectomy semen specimen shows azoospermia or only rare non-motile sperm.  He was once again told that even after vas occlusion is confirmed, vasectomy is not 100% reliable in preventing pregnancy and that the risk of pregnancy after vasectomy is approximately 1 in 2,000 men who have post-vasectomy azoospermia or semen analysis showing rare non-motile sperm. The reasons are early recanalization of the vas deferens or the presence of an accessory vas unrecognized at the time of surgery. There have also been cases of DNA-confirmed paternity despite documented azoospermia before and after conception. He was told to refrain from ejaculation for approximately one week after vasectomy. I advised him to hold any blood thinners prior to this procedure, to trim the hair on the scrotum the day before the procedure, and to arrange for someone to drive him to and from the procedure if possible. He understands that for a few days after the procedure he will need to take it easy with minimal activity, ice the area, and keep the compression dressing/jock strap on. He understands and elects to proceed with vasectomy. - Book for vasectomy under local anesthesia in clinic with me, next available  - Hold NSAIDs or Aspirin for 7 days before procedure  - Valium 10mg PRN before procedure (informed must have ride home if taking)    Thank you for this consult. I have personally reviewed all relevant medical records, labs, and imaging. MDM  Number of Diagnoses or Management Options  Encounter for sterilization: new, no workup     Amount and/or Complexity of Data Reviewed  Review and summarize past medical records: yes    Risk of Complications, Morbidity, and/or Mortality  Management options: low  General comments: Minor procedure - vasectomy      Darrell Cifuentes MD  Staff Urologist  [place] [id number]  Office: [phone]

## 2023-10-26 ENCOUNTER — HOSPITAL ENCOUNTER (OUTPATIENT)
Age: 33
Discharge: HOME | End: 2023-10-26
Payer: COMMERCIAL

## 2023-10-26 VITALS — TEMPERATURE: 98 F

## 2023-10-26 PROCEDURE — 90471 IMMUNIZATION ADMIN: CPT

## 2023-11-17 ENCOUNTER — LAB ENCOUNTER (OUTPATIENT)
Dept: LAB | Age: 33
End: 2023-11-17
Attending: FAMILY MEDICINE
Payer: COMMERCIAL

## 2023-11-17 ENCOUNTER — OFFICE VISIT (OUTPATIENT)
Dept: FAMILY MEDICINE CLINIC | Facility: CLINIC | Age: 33
End: 2023-11-17
Payer: COMMERCIAL

## 2023-11-17 DIAGNOSIS — Z13.29 SCREENING FOR ENDOCRINE DISORDER: ICD-10-CM

## 2023-11-17 DIAGNOSIS — Z00.00 ROUTINE GENERAL MEDICAL EXAMINATION AT A HEALTH CARE FACILITY: Primary | ICD-10-CM

## 2023-11-17 LAB
ALBUMIN SERPL-MCNC: 4.3 G/DL (ref 3.4–5)
ALBUMIN/GLOB SERPL: 1.2 {RATIO} (ref 1–2)
ALP LIVER SERPL-CCNC: 52 U/L
ALT SERPL-CCNC: 27 U/L
ANION GAP SERPL CALC-SCNC: 6 MMOL/L (ref 0–18)
AST SERPL-CCNC: 54 U/L (ref 15–37)
BASOPHILS # BLD AUTO: 0.04 X10(3) UL (ref 0–0.2)
BASOPHILS NFR BLD AUTO: 0.8 %
BILIRUB SERPL-MCNC: 0.8 MG/DL (ref 0.1–2)
BUN BLD-MCNC: 13 MG/DL (ref 9–23)
CALCIUM BLD-MCNC: 9.6 MG/DL (ref 8.5–10.1)
CHLORIDE SERPL-SCNC: 104 MMOL/L (ref 98–112)
CHOLEST SERPL-MCNC: 176 MG/DL (ref ?–200)
CO2 SERPL-SCNC: 28 MMOL/L (ref 21–32)
CREAT BLD-MCNC: 1.49 MG/DL
EGFRCR SERPLBLD CKD-EPI 2021: 62 ML/MIN/1.73M2 (ref 60–?)
EOSINOPHIL # BLD AUTO: 0.05 X10(3) UL (ref 0–0.7)
EOSINOPHIL NFR BLD AUTO: 1.1 %
ERYTHROCYTE [DISTWIDTH] IN BLOOD BY AUTOMATED COUNT: 12.2 %
FASTING PATIENT LIPID ANSWER: YES
FASTING STATUS PATIENT QL REPORTED: YES
GLOBULIN PLAS-MCNC: 3.5 G/DL (ref 2.8–4.4)
GLUCOSE BLD-MCNC: 95 MG/DL (ref 70–99)
HCT VFR BLD AUTO: 44.1 %
HDLC SERPL-MCNC: 40 MG/DL (ref 40–59)
HGB BLD-MCNC: 14.6 G/DL
IMM GRANULOCYTES # BLD AUTO: 0.01 X10(3) UL (ref 0–1)
IMM GRANULOCYTES NFR BLD: 0.2 %
LDLC SERPL CALC-MCNC: 120 MG/DL (ref ?–100)
LYMPHOCYTES # BLD AUTO: 2.43 X10(3) UL (ref 1–4)
LYMPHOCYTES NFR BLD AUTO: 51.5 %
MCH RBC QN AUTO: 28.6 PG (ref 26–34)
MCHC RBC AUTO-ENTMCNC: 33.1 G/DL (ref 31–37)
MCV RBC AUTO: 86.5 FL
MONOCYTES # BLD AUTO: 0.47 X10(3) UL (ref 0.1–1)
MONOCYTES NFR BLD AUTO: 10 %
NEUTROPHILS # BLD AUTO: 1.72 X10 (3) UL (ref 1.5–7.7)
NEUTROPHILS # BLD AUTO: 1.72 X10(3) UL (ref 1.5–7.7)
NEUTROPHILS NFR BLD AUTO: 36.4 %
NONHDLC SERPL-MCNC: 136 MG/DL (ref ?–130)
OSMOLALITY SERPL CALC.SUM OF ELEC: 286 MOSM/KG (ref 275–295)
PLATELET # BLD AUTO: 204 10(3)UL (ref 150–450)
POTASSIUM SERPL-SCNC: 3.8 MMOL/L (ref 3.5–5.1)
PROT SERPL-MCNC: 7.8 G/DL (ref 6.4–8.2)
RBC # BLD AUTO: 5.1 X10(6)UL
SODIUM SERPL-SCNC: 138 MMOL/L (ref 136–145)
TRIGL SERPL-MCNC: 84 MG/DL (ref 30–149)
TSI SER-ACNC: 1.36 MIU/ML (ref 0.36–3.74)
VLDLC SERPL CALC-MCNC: 15 MG/DL (ref 0–30)
WBC # BLD AUTO: 4.7 X10(3) UL (ref 4–11)

## 2023-11-17 PROCEDURE — 80050 GENERAL HEALTH PANEL: CPT | Performed by: FAMILY MEDICINE

## 2023-11-17 PROCEDURE — 80061 LIPID PANEL: CPT | Performed by: FAMILY MEDICINE

## 2023-11-17 PROCEDURE — 99395 PREV VISIT EST AGE 18-39: CPT | Performed by: FAMILY MEDICINE

## 2023-11-17 RX ORDER — ERGOCALCIFEROL 1.25 MG/1
50000 CAPSULE ORAL WEEKLY
Qty: 12 CAPSULE | Refills: 1 | Status: SHIPPED | OUTPATIENT
Start: 2023-11-17 | End: 2024-02-15

## 2024-02-18 ENCOUNTER — HOSPITAL ENCOUNTER (OUTPATIENT)
Age: 34
Discharge: HOME | End: 2024-02-18
Attending: EMERGENCY MEDICINE
Payer: COMMERCIAL

## 2024-02-18 VITALS
WEIGHT: 260 LBS | OXYGEN SATURATION: 99 % | SYSTOLIC BLOOD PRESSURE: 143 MMHG | HEIGHT: 76 IN | DIASTOLIC BLOOD PRESSURE: 93 MMHG | RESPIRATION RATE: 20 BRPM | TEMPERATURE: 99 F | HEART RATE: 80 BPM | BODY MASS INDEX: 31.66 KG/M2

## 2024-02-18 DIAGNOSIS — H10.32 ACUTE BACTERIAL CONJUNCTIVITIS OF LEFT EYE: Primary | ICD-10-CM

## 2024-02-18 PROCEDURE — 99213 OFFICE O/P EST LOW 20 MIN: CPT

## 2024-02-18 RX ORDER — POLYMYXIN B SULFATE AND TRIMETHOPRIM 1; 10000 MG/ML; [USP'U]/ML
1 SOLUTION OPHTHALMIC
Qty: 10 ML | Refills: 0 | Status: SHIPPED | OUTPATIENT
Start: 2024-02-18 | End: 2024-02-23

## 2024-02-18 NOTE — ED PROVIDER NOTES
Patient Seen in: [facility]      History     Chief Complaint   Patient presents with    Irritation     Stated Complaint: Eye Issue    Subjective:   HPI    33-year-old male presents to the immediate care for complaints of drainage from his left eye.  Patient states he woke this morning with some feeling of discomfort and drainage from the left eye.  He is stated that his eyelids were crusted over.  Denies any fevers or chills.  Denies URI symptoms.  Denies any eye pain or vision loss.    Objective:   History reviewed. No pertinent past medical history.           History reviewed. No pertinent surgical history.             Social History     Socioeconomic History    Marital status:    Tobacco Use    Smoking status: Never    Smokeless tobacco: Never   Vaping Use    Vaping Use: Never used   Substance and Sexual Activity    Alcohol use: Never    Drug use: No   Other Topics Concern    Caffeine Concern No    Exercise No    Seat Belt No    Special Diet No    Stress Concern No    Weight Concern No              Review of Systems    Positive for stated complaint: Eye Issue  Other systems are as noted in HPI.  Constitutional and vital signs reviewed.      All other systems reviewed and negative except as noted above.    Physical Exam     ED Triage Vitals   BP 02/18/24 0933 (!) 143/93   Pulse 02/18/24 0930 80   Resp 02/18/24 0930 20   Temp 02/18/24 0930 98.5 °F (36.9 °C)   Temp src 02/18/24 0930 Temporal   SpO2 02/18/24 0930 99 %   O2 Device 02/18/24 0930 None (Room air)       Current:BP (!) 143/93   Pulse 80   Temp 98.5 °F (36.9 °C) (Temporal)   Resp 20   Ht 193 cm (6' 4\")   Wt 117.9 kg   SpO2 99%   BMI 31.65 kg/m²     Right Eye Chart Acuity: 20/20, Uncorrected  Left Eye Chart Acuity: 20/15, Uncorrected    Physical Exam    General: Alert and oriented. No acute distress.  HEENT: Normocephalic. No evidence of trauma. Extraocular movements are intact.  Minimal conjunctival injection to the left eye.   No foreign body noted.  Anterior chamber appears grossly clear.  Negative fluorescein uptake  Extremities: No evidence of deformity. No clubbing or cyanosis.  Neuro: No focal deficit is noted.    ED Course   Labs Reviewed - No data to display    Patient's clinical presentation appears to be consistent with early onset conjunctivitis.  He will be discharged home with Polytrim eyedrops.         MDM   Patient was screened and evaluated during this visit.   As a treating physician attending to the patient, I determined, within reasonable clinical confidence and prior to discharge, that an emergency medical condition was not or was no longer present.  There was no indication for further evaluation, treatment or admission on an emergency basis.  Comprehensive verbal and written discharge and follow-up instructions were provided to help prevent relapse or worsening.  Patient was instructed to follow-up with her primary care provider for further evaluation and treatment, but to return immediately to the ER for worsening, concerning, new, changing or persisting symptoms.  I discussed the case with the patient and they had no questions, complaints, or concerns.  Patient felt comfortable going home.    ^^Please note that this report has been produced using speech recognition software and may contain errors related to that system including, but not limited to, errors in grammar, punctuation, and spelling, as well as words and phrases that possibly may have been recognized inappropriately.  If there are any questions or concerns, contact the dictating provider for clarification                                 Medical Decision Making      Disposition and Plan     Clinical Impression:  1. Acute bacterial conjunctivitis of left eye         Disposition:  Discharge  2/18/2024  9:47 am    Follow-up:  Louis Shahid MD  [id number]  [facility] [address]  [phone]    Call   As needed, If symptoms worsen          Medications  Prescribed:  Current Discharge Medication List        START taking these medications    Details   polymyxin B-trimethoprim 95958-4.1 UNIT/ML-% Ophthalmic Solution Apply 1 drop to eye Q3H While Awake for 5 days.  Qty: 10 mL, Refills: 0

## 2024-02-18 NOTE — DISCHARGE INSTRUCTIONS
Follow-up with your primary care doctor as needed  Use Polytrim drops every 3 hours while awake for 5 days  Frequent handwashing to avoid any contact spread  Use a clean washcloth to wipe your eyes  Return if any new concerns

## 2024-02-18 NOTE — ED INITIAL ASSESSMENT (HPI)
Pt states this morning he felt something in his left eye, as if something is inside. No visible drainage, pt states no pain.

## 2024-11-14 ENCOUNTER — IMMUNIZATION (OUTPATIENT)
Dept: FAMILY MEDICINE CLINIC | Facility: CLINIC | Age: 34
End: 2024-11-14
Payer: COMMERCIAL

## 2024-11-14 DIAGNOSIS — Z23 NEED FOR INFLUENZA VACCINATION: Primary | ICD-10-CM

## 2024-11-14 PROCEDURE — 90471 IMMUNIZATION ADMIN: CPT | Performed by: FAMILY MEDICINE

## 2024-11-14 PROCEDURE — 90656 IIV3 VACC NO PRSV 0.5 ML IM: CPT | Performed by: FAMILY MEDICINE

## 2025-06-05 ENCOUNTER — OFFICE VISIT (OUTPATIENT)
Dept: FAMILY MEDICINE CLINIC | Facility: CLINIC | Age: 35
End: 2025-06-05
Payer: COMMERCIAL

## 2025-06-05 VITALS
SYSTOLIC BLOOD PRESSURE: 128 MMHG | RESPIRATION RATE: 18 BRPM | WEIGHT: 271 LBS | BODY MASS INDEX: 33 KG/M2 | HEIGHT: 76 IN | HEART RATE: 98 BPM | DIASTOLIC BLOOD PRESSURE: 90 MMHG | OXYGEN SATURATION: 98 %

## 2025-06-05 DIAGNOSIS — Z23 NEED FOR TDAP VACCINATION: ICD-10-CM

## 2025-06-05 DIAGNOSIS — Z00.00 ENCOUNTER FOR ANNUAL PHYSICAL EXAM: Primary | ICD-10-CM

## 2025-06-05 NOTE — PROGRESS NOTES
The following individual(s) verbally consented to be recorded using ambient AI listening technology and understand that they can each withdraw their consent to this listening technology at any point by asking the clinician to turn off or pause the recording:    Patient name: Jj Merritt  Additional names:

## 2025-06-06 NOTE — PROGRESS NOTES
HPI:    Jj Merritt is a 34 year old male who presents for Well Adult     History of Present Illness  Jj Merritt is a 34 year old male who presents for an annual physical exam. He is accompanied by his son, who is in the lobby.    He mentions that his previous diabetes screening was normal with glucose levels below 100, although in 2022 it was slightly elevated. He has been using creatine monohydrate, typically 5 mg, cycling a few months on and off, and is mindful of hydration and protein intake. He notes that his creatinine levels were high.    He experiences elevated blood pressure today but reports no unusual symptoms. He has a history of back pain, which he manages with Aleve. He monitors his blood pressure at home, usually finding it in the 120s to 130s over 80s. He is working on dietary changes, reducing red meat and carbohydrates, and increasing water intake. He engages in regular physical activity, including functional movement exercises and basketball.    He is currently in the process of moving to a larger home to accommodate family needs, which has been stressful. His mother-in-law has dementia, and his mother has multiple myeloma, which influences his living arrangements.    He reports occasional gastrointestinal discomfort, particularly with certain meats and possibly spicy foods, but denies heartburn.       Past History:   He  has no past medical history on file.   He  has no past surgical history on file.   His family history includes Cancer in his mother; Dementia in his maternal grandfather; Diabetes in his maternal grandmother; Heart Disorder in his maternal grandmother.   He  reports that he has never smoked. He has never used smokeless tobacco. He reports that he does not drink alcohol and does not use drugs.     He is not on any long-term medications.   He is allergic to seasonal.   Medications Ordered Prior to this Encounter[1]      REVIEW OF SYSTEMS:   Patient denies shortness of  breath, denies chest pain and denies any recent fevers or chills.    Patient reports no urinary complaints and denies headaches or visual disturbances.   Patient denies any abdominal pain at this time. Patient has no new skin lesions.  Patient reports no acute back pain and reports no dizziness or headaches.   Patient reports no visual disturbances and reports hearing has been about the same.   Patient reports no recent injury or trauma.               EXAM:    /90   Pulse 98   Resp 18   Ht 6' 4\" (1.93 m)   Wt 271 lb (122.9 kg)   SpO2 98%   BMI 32.99 kg/m²  Estimated body mass index is 32.99 kg/m² as calculated from the following:    Height as of this encounter: 6' 4\" (1.93 m).    Weight as of this encounter: 271 lb (122.9 kg).    General Appearance:  Alert, cooperative, no distress, appears stated age   Head:  Normocephalic, without obvious abnormality, atraumatic   Eyes:  conjunctiva/cornea is not erythematous.        Nose: No nasal drainage.    Throat: No erythema    Neck: Supple, symmetrical, trachea midline, and normal ROM  thyroid: no obvious nodules   Back:   Symmetric, no curvature, ROM normal, no CVA tenderness   Lungs:   Clear to auscultation bilaterally, respirations unlabored   Chest Wall:  No tenderness or deformity   Heart:  Regular rate and rhythm, S1, S2 normal, no murmur,   Abdomen:   Soft, non-tender, bowel sounds active. No hernia.    Genitalia:     Rectal:     Extremities: Extremities normal, atraumatic, no cyanosis or edema   Pulses: 2+ and symmetric   Skin: Skin color, texture, turgor normal, no new rashes    Lymph nodes: No obvious cervical adenopathy.    Neurologic and psych: Normal speech, Alert and oriented x 3.   Normal mood, normal insight and judgment.                Assessment & Plan  Hypertension  Blood pressure is elevated today with occasional home readings in the 120s-130s/80s. No family history of hypertension, but brother takes medication that incidentally lowers blood  pressure. Current stress from moving and family health issues may contribute to elevated readings. He is working on dietary modifications, including reducing sodium intake and increasing water consumption. Engages in regular physical activity, including functional movement exercises and basketball, which are beneficial for cardiovascular health. Discussed monitoring blood pressure at home and potential need for medication if readings consistently approach 140/90 mmHg.  - Recheck blood pressure today.  - Monitor blood pressure at home a few times a month, especially when relaxed.  - If home readings consistently approach 140/90 mmHg, consider starting a diuretic.  - Encourage continued dietary modifications to reduce sodium intake.  - Encourage regular aerobic exercise.    Gastroesophageal Discomfort  Reports occasional epigastric discomfort, possibly related to dietary choices, such as spicy foods. No heartburn reported, but discomfort occurs with certain foods. Motivated to adjust diet to alleviate symptoms. Discussed use of over-the-counter antacids for symptomatic relief and dietary adjustments to identify and avoid trigger foods.  - Recommend over-the-counter Tums or Pepcid for symptomatic relief.  - Advise minimizing intake of spicy foods and red sauces.  - Encourage dietary adjustments to identify and avoid trigger foods.    General Health Maintenance  Due for routine health maintenance, including vaccinations and blood work. Discussed the importance of preventive care and maintaining a healthy lifestyle. Tetanus booster is recommended due to moving activities and potential exposure to injuries.  - Administer tetanus booster today.  - Schedule blood work, including cholesterol, liver, kidney, and thyroid function tests.     Louis Shahid MD, 6/6/2025, 10:10 AM     Note to patient: The 21st Century Cures Act makes medical notes like these available to patients in the interest of transparency. However, this is a  medical document intended as peer to peer communication. It is written in medical language and may contain abbreviations or verbiage that are unfamiliar. It may appear blunt or direct. Medical documents are intended to carry relevant information, facts as evident, and the clinical opinion of the practitioner who signs the document.        [1]   Current Outpatient Medications on File Prior to Visit   Medication Sig    Misc Natural Products (SUPER GREENS OR) Take 1 Scoop by mouth in the morning.    Multiple Vitamin (ONE-DAILY MULTI VITAMINS) Oral Tab Take 1 tablet by mouth daily.     No current facility-administered medications on file prior to visit.

## 2025-06-18 ENCOUNTER — LAB ENCOUNTER (OUTPATIENT)
Dept: LAB | Age: 35
End: 2025-06-18
Attending: FAMILY MEDICINE
Payer: COMMERCIAL

## 2025-06-18 DIAGNOSIS — Z00.00 ENCOUNTER FOR ANNUAL PHYSICAL EXAM: ICD-10-CM

## 2025-06-18 LAB
ALBUMIN SERPL-MCNC: 4.8 G/DL (ref 3.2–4.8)
ALBUMIN/GLOB SERPL: 1.9 {RATIO} (ref 1–2)
ALP LIVER SERPL-CCNC: 58 U/L (ref 45–117)
ALT SERPL-CCNC: 16 U/L (ref 10–49)
ANION GAP SERPL CALC-SCNC: 7 MMOL/L (ref 0–18)
AST SERPL-CCNC: 21 U/L (ref ?–34)
BASOPHILS # BLD AUTO: 0.03 X10(3) UL (ref 0–0.2)
BASOPHILS NFR BLD AUTO: 0.6 %
BILIRUB SERPL-MCNC: 0.6 MG/DL (ref 0.3–1.2)
BUN BLD-MCNC: 15 MG/DL (ref 9–23)
CALCIUM BLD-MCNC: 10.1 MG/DL (ref 8.7–10.6)
CHLORIDE SERPL-SCNC: 104 MMOL/L (ref 98–112)
CHOLEST SERPL-MCNC: 176 MG/DL (ref ?–200)
CO2 SERPL-SCNC: 30 MMOL/L (ref 21–32)
CREAT BLD-MCNC: 1.47 MG/DL (ref 0.7–1.3)
EGFRCR SERPLBLD CKD-EPI 2021: 63 ML/MIN/1.73M2 (ref 60–?)
EOSINOPHIL # BLD AUTO: 0.05 X10(3) UL (ref 0–0.7)
EOSINOPHIL NFR BLD AUTO: 1 %
ERYTHROCYTE [DISTWIDTH] IN BLOOD BY AUTOMATED COUNT: 12.3 %
FASTING PATIENT LIPID ANSWER: YES
FASTING STATUS PATIENT QL REPORTED: YES
GLOBULIN PLAS-MCNC: 2.5 G/DL (ref 2–3.5)
GLUCOSE BLD-MCNC: 111 MG/DL (ref 70–99)
HCT VFR BLD AUTO: 41.3 % (ref 39–53)
HDLC SERPL-MCNC: 41 MG/DL (ref 40–59)
HGB BLD-MCNC: 13.8 G/DL (ref 13–17.5)
IMM GRANULOCYTES # BLD AUTO: 0 X10(3) UL (ref 0–1)
IMM GRANULOCYTES NFR BLD: 0 %
LDLC SERPL CALC-MCNC: 121 MG/DL (ref ?–100)
LYMPHOCYTES # BLD AUTO: 2.46 X10(3) UL (ref 1–4)
LYMPHOCYTES NFR BLD AUTO: 50.8 %
MCH RBC QN AUTO: 28.3 PG (ref 26–34)
MCHC RBC AUTO-ENTMCNC: 33.4 G/DL (ref 31–37)
MCV RBC AUTO: 84.8 FL (ref 80–100)
MONOCYTES # BLD AUTO: 0.41 X10(3) UL (ref 0.1–1)
MONOCYTES NFR BLD AUTO: 8.5 %
NEUTROPHILS # BLD AUTO: 1.89 X10 (3) UL (ref 1.5–7.7)
NEUTROPHILS # BLD AUTO: 1.89 X10(3) UL (ref 1.5–7.7)
NEUTROPHILS NFR BLD AUTO: 39.1 %
NONHDLC SERPL-MCNC: 135 MG/DL (ref ?–130)
OSMOLALITY SERPL CALC.SUM OF ELEC: 294 MOSM/KG (ref 275–295)
PLATELET # BLD AUTO: 238 10(3)UL (ref 150–450)
POTASSIUM SERPL-SCNC: 4.6 MMOL/L (ref 3.5–5.1)
PROT SERPL-MCNC: 7.3 G/DL (ref 5.7–8.2)
RBC # BLD AUTO: 4.87 X10(6)UL (ref 4.3–5.7)
SODIUM SERPL-SCNC: 141 MMOL/L (ref 136–145)
TRIGL SERPL-MCNC: 76 MG/DL (ref 30–149)
TSI SER-ACNC: 1.24 UIU/ML (ref 0.55–4.78)
VLDLC SERPL CALC-MCNC: 13 MG/DL (ref 0–30)
WBC # BLD AUTO: 4.8 X10(3) UL (ref 4–11)

## 2025-06-18 PROCEDURE — 36415 COLL VENOUS BLD VENIPUNCTURE: CPT

## 2025-06-18 PROCEDURE — 85025 COMPLETE CBC W/AUTO DIFF WBC: CPT

## 2025-06-18 PROCEDURE — 80053 COMPREHEN METABOLIC PANEL: CPT

## 2025-06-18 PROCEDURE — 84443 ASSAY THYROID STIM HORMONE: CPT

## 2025-06-18 PROCEDURE — 80061 LIPID PANEL: CPT

## (undated) NOTE — LETTER
[facility] [address] [place] [address]  [facility] [address] [phone]     Patient: María Haque   YOB: 1990   Date of Visit: 4/13/2021     Dear Employer,        April 13, 2021    At [facility] beds at the [facility], we are taking special discontinue isolation and other precautions 10 days after the date of their first positive RT-PCR test for SARS-CoV-2 RNA.     Persons who are asymptomatic but have been exposed, CDC recommends 14 days of quarantine after exposure based on the time it takes